# Patient Record
Sex: MALE | Race: WHITE | NOT HISPANIC OR LATINO | URBAN - NONMETROPOLITAN AREA
[De-identification: names, ages, dates, MRNs, and addresses within clinical notes are randomized per-mention and may not be internally consistent; named-entity substitution may affect disease eponyms.]

---

## 2020-09-29 ENCOUNTER — IMPORTED ENCOUNTER (OUTPATIENT)
Dept: URBAN - NONMETROPOLITAN AREA CLINIC 1 | Facility: CLINIC | Age: 80
End: 2020-09-29

## 2020-09-29 PROBLEM — H18.51: Noted: 2018-10-25

## 2020-09-29 PROBLEM — H52.4: Noted: 2018-10-25

## 2020-09-29 PROBLEM — H52.223: Noted: 2018-10-25

## 2020-09-29 PROBLEM — H52.03: Noted: 2018-10-25

## 2020-09-29 PROBLEM — H25.813: Noted: 2020-09-29

## 2020-09-29 PROCEDURE — 92015 DETERMINE REFRACTIVE STATE: CPT

## 2020-09-29 PROCEDURE — 92014 COMPRE OPH EXAM EST PT 1/>: CPT

## 2020-09-29 NOTE — PATIENT DISCUSSION
Presbyopia/Hyperopia/Astigmatism-Discussed diagnosis with patient.-Recommend Observation-Recommend defer updating spectacles due to cataract progressionCataract OU-Reviewed symptoms of advancing cataract growth such as glare and halos and decreased vision.-Recommend Cataract Evaluation with Dr. Cristie Fothergill next available. 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 Recommend continued yearly follow-up evaluation.

## 2020-11-16 ENCOUNTER — IMPORTED ENCOUNTER (OUTPATIENT)
Dept: URBAN - NONMETROPOLITAN AREA CLINIC 1 | Facility: CLINIC | Age: 80
End: 2020-11-16

## 2020-11-16 PROBLEM — H25.813: Noted: 2020-11-16

## 2020-11-16 PROBLEM — H18.51: Noted: 2020-11-16

## 2020-11-16 PROCEDURE — 92014 COMPRE OPH EXAM EST PT 1/>: CPT

## 2020-11-16 NOTE — PATIENT DISCUSSION
Cataract(s)-Visually significant cataract OU .-Cataract(s) causing symptomatic impairment of visual function not correctable with a tolerable change in glasses or contact lenses lighting or non-operative means resulting in specific activity limitations and/or participation restrictions including but not limited to reading viewing television driving or meeting vocational or recreational needs. -Expectation is clearer vision and functional improvement in symptoms as well as reduced glare disability after cataract removal.-Order IOLMaster and OPD today. -Recommend Sand/Trad based on today's OPD testing and lifestyle questionnaire.-All questions were answered regarding surgery including pre and post-op medications appointments activity restrictions and anesthetic usage.-The risks benefits and alternatives and special risk factors for the patient were discussed in detail including but not limited to: bleeding infection retinal detachment vitreous loss problems with the implant and possible need for additional surgery.-Although rare the possibility of complete vision loss was discussed.-The possible need for glasses post-operatively was discussed.-Order medical clearance exam based on history of high cholesterol-Patient elects to proceed with cataract surgery OS . Will schedule at patient's convenience and re-evaluate OD  in the future. Discussed all lens options in detail with patient. Recommend Stand/Trad OU and patient agrees.

## 2020-11-18 ENCOUNTER — IMPORTED ENCOUNTER (OUTPATIENT)
Dept: URBAN - NONMETROPOLITAN AREA CLINIC 1 | Facility: CLINIC | Age: 80
End: 2020-11-18

## 2020-11-18 PROBLEM — Z01.818: Noted: 2020-11-18

## 2020-11-18 PROBLEM — E78.5: Noted: 2020-11-18

## 2020-11-18 PROBLEM — M19.90: Noted: 2020-11-18

## 2020-11-18 PROBLEM — I10: Noted: 2020-11-18

## 2020-12-04 ENCOUNTER — IMPORTED ENCOUNTER (OUTPATIENT)
Dept: URBAN - NONMETROPOLITAN AREA CLINIC 1 | Facility: CLINIC | Age: 80
End: 2020-12-04

## 2020-12-04 PROBLEM — Z98.42: Noted: 2020-12-04

## 2020-12-04 PROBLEM — H25.811: Noted: 2020-12-04

## 2020-12-04 PROCEDURE — 92136 OPHTHALMIC BIOMETRY: CPT

## 2020-12-04 PROCEDURE — 66984 XCAPSL CTRC RMVL W/O ECP: CPT

## 2020-12-04 NOTE — PATIENT DISCUSSION
Same Day s/p PCIOL OS stand/trad (12/04/20)-Pt doing well s/p PCIOL. -Continue post-op gtts according to instruction sheet and sleep with eye shield over eye for 7 nights.-Avoid bending at the waist lifting anything over 5lbs and dirty or brennan environments. Cataract OD- Recommend proceed with cataract surgery as previously scheduled and discussed with Dr. Beverley Rubi.

## 2020-12-10 ENCOUNTER — IMPORTED ENCOUNTER (OUTPATIENT)
Dept: URBAN - NONMETROPOLITAN AREA CLINIC 1 | Facility: CLINIC | Age: 80
End: 2020-12-10

## 2020-12-10 PROCEDURE — 99024 POSTOP FOLLOW-UP VISIT: CPT

## 2020-12-10 NOTE — PATIENT DISCUSSION
1 week s/p PCIOL OS stand/trad (12/04/20)-Pt doing well at 1 week s/p PCIOL. -Continue post-op gtts according to instruction sheet.-Okay to resume usual activites and d/c eye shield. Cataract OD- Recommend proceed with cataract surgery as previously scheduled and discussed with Dr. Anca Hernandez.

## 2020-12-18 ENCOUNTER — IMPORTED ENCOUNTER (OUTPATIENT)
Dept: URBAN - NONMETROPOLITAN AREA CLINIC 1 | Facility: CLINIC | Age: 80
End: 2020-12-18

## 2020-12-18 PROBLEM — Z98.41: Noted: 2020-12-18

## 2020-12-18 PROBLEM — Z98.42: Noted: 2020-12-18

## 2020-12-18 PROCEDURE — 92136 OPHTHALMIC BIOMETRY: CPT

## 2020-12-18 PROCEDURE — 99024 POSTOP FOLLOW-UP VISIT: CPT

## 2020-12-18 PROCEDURE — 66984 XCAPSL CTRC RMVL W/O ECP: CPT

## 2020-12-18 NOTE — PATIENT DISCUSSION
Same Day s/p PCIOL OD stand/trad (12/18/20)-Pt doing well s/p PCIOL. -Continue post-op gtts according to instruction sheet and sleep with eye shield over eye for 7 nights.-Avoid bending at the waist lifting anything over 5lbs and dirty or brennan environments. 1 week s/p PCIOL OS stand/trad (12/04/20)-Pt doing well at 1 week s/p PCIOL. -Continue post-op gtts according to instruction sheet.-Okay to resume usual activites and d/c eye shield.

## 2022-04-09 ASSESSMENT — VISUAL ACUITY
OD_PAM: 20/25
OD_GLARE: 20/100
OS_SC: 20/40
OS_CC: 20/100
OD_PAM: 20/25
OS_CC: 20/30
OD_GLARE: 20/100
OD_PH: 20/30
OS_CC: 20/25
OS_GLARE: 20/200
OS_AM: 20/20
OD_CC: 20/100
OD_SC: 20/30
OD_SC: 20/40
OD_PAM: 20/25
OS_CC: 20/300
OD_PH: 20/25
OS_SC: 20/40
OS_CC: 20/100
OD_SC: 20/30
OD_PH: 20/25
OS_SC: 20/30
OD_GLARE: 20/100
OD_PH: 20/80+
OS_PH: 20/150
OS_GLARE: 20/200
OS_CC: 20/30
OS_AM: 20/20
OD_CC: 20/30
OS_SC: 20/30
OD_GLARE: 20/100
OD_CC: 20/200
OD_CC: 20/50

## 2022-04-09 ASSESSMENT — TONOMETRY
OD_IOP_MMHG: 16
OS_IOP_MMHG: 17
OS_IOP_MMHG: 12
OD_IOP_MMHG: 17
OD_IOP_MMHG: 16
OS_IOP_MMHG: 16
OS_IOP_MMHG: 17
OS_IOP_MMHG: 14
OD_IOP_MMHG: 12
OD_IOP_MMHG: 15

## 2022-04-21 ENCOUNTER — ESTABLISHED PATIENT (OUTPATIENT)
Dept: RURAL CLINIC 3 | Facility: CLINIC | Age: 82
End: 2022-04-21

## 2022-04-21 DIAGNOSIS — Z96.1: ICD-10-CM

## 2022-04-21 DIAGNOSIS — H52.4: ICD-10-CM

## 2022-04-21 PROCEDURE — 99214 OFFICE O/P EST MOD 30 MIN: CPT

## 2022-04-21 PROCEDURE — 92015 DETERMINE REFRACTIVE STATE: CPT

## 2022-04-21 ASSESSMENT — TONOMETRY
OS_IOP_MMHG: 16
OD_IOP_MMHG: 16

## 2022-04-21 ASSESSMENT — VISUAL ACUITY
OS_SC: 20/50
OD_SC: 20/20
OS_PH: 20/20

## 2024-05-15 ENCOUNTER — ESTABLISHED PATIENT (OUTPATIENT)
Dept: RURAL CLINIC 3 | Facility: CLINIC | Age: 84
End: 2024-05-15

## 2024-05-15 DIAGNOSIS — Z96.1: ICD-10-CM

## 2024-05-15 DIAGNOSIS — H10.13: ICD-10-CM

## 2024-05-15 DIAGNOSIS — H52.4: ICD-10-CM

## 2024-05-15 PROCEDURE — 92015 DETERMINE REFRACTIVE STATE: CPT

## 2024-05-15 PROCEDURE — 92014 COMPRE OPH EXAM EST PT 1/>: CPT

## 2024-05-15 ASSESSMENT — VISUAL ACUITY
OD_CC: 20/25
OS_CC: 20/25

## 2024-05-15 ASSESSMENT — TONOMETRY
OD_IOP_MMHG: 15
OS_IOP_MMHG: 16